# Patient Record
Sex: MALE | Race: WHITE | NOT HISPANIC OR LATINO | Employment: UNEMPLOYED | URBAN - METROPOLITAN AREA
[De-identification: names, ages, dates, MRNs, and addresses within clinical notes are randomized per-mention and may not be internally consistent; named-entity substitution may affect disease eponyms.]

---

## 2017-01-09 ENCOUNTER — GENERIC CONVERSION - ENCOUNTER (OUTPATIENT)
Dept: OTHER | Facility: OTHER | Age: 57
End: 2017-01-09

## 2017-02-07 ENCOUNTER — GENERIC CONVERSION - ENCOUNTER (OUTPATIENT)
Dept: OTHER | Facility: OTHER | Age: 57
End: 2017-02-07

## 2017-03-24 ENCOUNTER — GENERIC CONVERSION - ENCOUNTER (OUTPATIENT)
Dept: OTHER | Facility: OTHER | Age: 57
End: 2017-03-24

## 2017-05-02 ENCOUNTER — GENERIC CONVERSION - ENCOUNTER (OUTPATIENT)
Dept: OTHER | Facility: OTHER | Age: 57
End: 2017-05-02

## 2017-06-16 ENCOUNTER — GENERIC CONVERSION - ENCOUNTER (OUTPATIENT)
Dept: OTHER | Facility: OTHER | Age: 57
End: 2017-06-16

## 2017-07-25 ENCOUNTER — GENERIC CONVERSION - ENCOUNTER (OUTPATIENT)
Dept: OTHER | Facility: OTHER | Age: 57
End: 2017-07-25

## 2017-09-13 ENCOUNTER — GENERIC CONVERSION - ENCOUNTER (OUTPATIENT)
Dept: OTHER | Facility: OTHER | Age: 57
End: 2017-09-13

## 2017-10-11 ENCOUNTER — GENERIC CONVERSION - ENCOUNTER (OUTPATIENT)
Dept: OTHER | Facility: OTHER | Age: 57
End: 2017-10-11

## 2017-10-13 ENCOUNTER — GENERIC CONVERSION - ENCOUNTER (OUTPATIENT)
Dept: OTHER | Facility: OTHER | Age: 57
End: 2017-10-13

## 2017-11-07 ENCOUNTER — GENERIC CONVERSION - ENCOUNTER (OUTPATIENT)
Dept: OTHER | Facility: OTHER | Age: 57
End: 2017-11-07

## 2018-01-12 NOTE — PROGRESS NOTES
Assessment    1  Medicare annual wellness visit, subsequent (V70 0) (Z00 00)    Plan  Hypothyroidism    · From  Levothyroxine Sodium 300 MCG Oral Tablet TAKE 1 TABLET Daily six  (6) days a week or as directed To Levothyroxine Sodium 300 MCG Oral Tablet Take 1  tablet daily  PMH: Encounter for preventive health examination    · *VB - Fall Risk Assessment  (Dx V80 09 Screen for Neurologic Disorder);  Status:Complete;   Done: [id number] 11:02AM   · *VB Glaucoma Screen; Status:Complete;   Done: [id number] 11:03AM   · *VB-Depression Screening; Status:Complete;   Done: 25ZTL6000 11:05AM   · *VB-Urinary Incontinence Screen (Dx V81 6 Screen for UI); Status:Complete;   Done:  26RFQ4228 11:05AM    Discussion/Summary    He will continue to see multiple specialists w [facility] as is - will be undergoing PET scan soon to assess treatment response w chemo for lymphoma  See AWV screening papers - watch for falls, (uses cane)  Cardiac status, renal status appear stable - Diabetes has fluctuated w treatments  Watch feet closely w past hx, also will see Eye Dr Larry ireland - call for renewal when needed  Impression: Subsequent Annual Wellness Visit  Cardiovascular screening and counseling: screening is current  Diabetes screening and counseling: screening is current  Colorectal cancer screening and counseling: never done  Prostate cancer screening and counseling: sees Oncology  Glaucoma screening and counseling: is due  Immunizations: influenza vaccine is up to date this year, influenza vaccination is recommended annually and had pneumovax  Advance Directive Planning: sees Oncology full code status  Chief Complaint  AWV / reg check      History of Present Illness  HPI: in for AWV- since last here he has been dx w BCell Lymphoma- (was here w severe pharyngitis which led to dx) continues to see multiple specialists w [facility] - on chemo - washed out re same - lost 35 lbs    Endo/ Cardiology/ Nephrology (Crt 1 45 range, eGFR 53)/Oncology  Had DVT upper ext - completed anticoagulation    Glucometer 120-180, higher w chemo    Last hospital stay Dec at [address] [facility] and Wellness Visits: The patient is being seen for the subsequent annual wellness visit  Medicare Screening and Risk Factors   Hospitalizations: 12/17/2015  Medicare Screening Tests Risk Questions   Drug and Alcohol Use: The patient has never smoked cigarettes  The patient reports never drinking alcohol  Alcohol concern:   The patient has no concerns about alcohol abuse  He has never used illicit drugs  Diet and Physical Activity: Current diet includes low carbohydrate food choices, low salt food choices, limited junk food, 1-2 servings of fruit per day, 1-2 servings of vegetables per day, 2 servings of meat per day, 3 servings of dairy products per day, 1 cups of coffee per day, 4 cups of tea per day, 1 cans of diet soda per day and 2 glasses of water daily  He exercises infrequently  Exercise: walking  Mood Disorder and Cognitive Impairment Screening: He denies feeling down, depressed, or hopeless over the past two weeks  He reports feeling little interest or pleasure in doing things over the past two weeks  Cognitive impairment screening: denies difficulty learning/retaining new information, denies difficulty handling complex tasks, denies difficulty with reasoning, denies difficulty with spatial ability and orientation, denies difficulty with language and denies difficulty with behavior  Functional Ability/Level of Safety: Hearing is slightly decreased and a hearing aid is not used  Activities of daily living details: does not need help using the phone, no transportation help needed, does not need help shopping, no meal preparation help needed, does not need help doing housework, does not need help doing laundry, does not need help managing medications and does not need help managing money  Fall risk factors:   The patient fell 2 times in the past 12 months  and no urinary incontinence  Home safety risk factors:  unfamiliar surroundings, uneven floors and no handrails on the stairs, but no loose rugs, no poor household lighting, no household clutter and grab bars in the bathroom  Advance Directives: Advance directives: no living will, no durable power of  for health care directives and no advance directives  Co-Managers and Medical Equipment/Suppliers: See Patient Care Team      Review of Systems    Constitutional: as noted in HPI, no fever and no chills  ENT: sore throat, but as noted in HPI and no earache  Cardiovascular: no chest pain, no palpitations and the heart is not racing  Respiratory: no wheezing, not sleeping upright or with extra pillows, no cough, no productive cough and not vomiting blood  Gastrointestinal: no abdominal pain, no abdominal bloating, no abdominal cramps, no nausea, no vomiting, no constipation, no bright red blood per rectum and no melena  Genitourinary: no dysuria  Integumentary and Breasts: no rashes and no skin lesions  Psychiatric: no anxiety and no depression  Hematologic and Lymphatic: no bleeding, but no swollen glands and no swollen glands in the neck  Yes, the patient is satisfied with His life  No, the patient has not dropped any activities or interests  No, the patient does not feel that their life is empty  Yes, the patient often gets bored  Point = 1  Yes, the patient is in good spirits most of the time  No, the patient is not afraid something bad is going to happen to them  Yes, the patient feels happy most of the time  No, the patient does not often feel helpless  No, the patient bright not often get restless and fidgety  No, the patient likes to go out and try new things  Yes, the patient thinks its wonderful to be alive now  No, the patient does not feel worthless the way they are currently        No, the patient does not feel full of energy  Point = 1  No, the patient does not feel their situation is hopeless  No, the patient does not feel that most people are better off then they are  Score 2   Normal 0 - 9, Mild Depression 10 - 19, Severe Depression 20 - 30      Active Problems    1  Aortic stenosis (747 22) (Q25 3)   2  History of Aortic Valve Replacement   3  B-cell lymphoma (202 80) (C85 10)   4  Cardiomyopathy (425 4) (I42 9)   5  Charcot's Joint Of The Left Foot (713 5)   6  Chronic kidney disease, stage 3 (585 3) (N18 3)   7  Congestive heart failure (428 0) (I50 9)   8  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 (362 07)   9  Edema (782 3) (R60 9)   10  Hyperlipidemia (272 4) (E78 5)   11  Hypertension (401 9) (I10)   12  Hypothyroidism (244 9) (E03 9)   13  History of Implantable Cardioverter-Defibrillator   14  History of Metacarpal Amputation (Each)   15  History of Nephrolithiasis (V13 01)   16  Obesity (278 00) (E66 9)   17  Peripheral neuropathy (356 9) (G62 9)   18  Poorly controlled type 2 diabetes mellitus (250 00) (E11 65)   19  Third degree AV block (426 0) (I44 2)   20  History of Transient Acute Renal Failure (584 9)    Past Medical History    · History of Nephrolithiasis (V13 01)   · History of Transient Acute Renal Failure (584 9)    The active problems and past medical history were reviewed and updated today  Surgical History    · History of Aortic Valve Replacement   · History of Appendectomy   · History of Cardiac Cath Procedure Outcome:   · History of Cholecystotomy   · History of Implantable Cardioverter-Defibrillator   · History of Metacarpal Amputation (Each)    The surgical history was reviewed and updated today  Family History    · Family history of Coronary atherosclerosis    Social History    · Never A Smoker   · No alcohol use   · Physical Disability:  The social history was reviewed and updated today  Current Meds   1  Allopurinol 100 MG Oral Tablet;  Take 1 tablet daily; Therapy: (Recorded:08Apr2016) to Recorded   2  Aspirin EC 81 MG Oral Tablet Delayed Release; Take 1 tablet daily; Therapy: (Recorded:16Mar2015) to Recorded   3  Carvedilol 25 MG Oral Tablet; TAKE 1 TABLET TWICE DAILY; Therapy: [id number] to Recorded   4  HumuLIN R U-500 (CONCENTRATED) 500 UNIT/ML Subcutaneous Solution; Inject 19   units at 6 am, 19 units at noon, 17 units at 6   pm and 15 units at midnight; Therapy: [id number] to Recorded   5  Levothyroxine Sodium 300 MCG Oral Tablet; TAKE 1 TABLET Daily six (6) days a week   or as directed; Therapy: [id number] to (Shasta Otero)  Requested for: 08Apr2015; Last   Rx:19Nov2014; Status: ACTIVE - Renewal Voided Ordered   6  Losartan Potassium 25 MG Oral Tablet; TAKE 1 TABLET TWICE DAILY  (holds if AM BP   low); Therapy: [id number] to (Wes Mims) Recorded   7  Torsemide 20 MG Oral Tablet; 40 mg twice a day or as directed; Therapy: 76RZO1054 to Recorded   8  Zetia 10 MG Oral Tablet; Take 1 tablet daily; Therapy: 96QSV4706 to Recorded    The medication list was reviewed and updated today  Allergies    1  Actos TABS   2  Lipitor TABS   3  Pravachol TABS    Immunizations   1 2 3    Influenza  [id number]     Pneumococcal  [id number] [id number]     Vitals  Signs [Data Includes: Current Encounter]    Heart Rate: 88  Systolic: 537  Diastolic: 88  Height: 5 ft 11 in  Weight: 318 lb 2 08 oz  BMI Calculated: 44 37  BSA Calculated: 2 56    Physical Exam    Constitutional noticeable weight loss, hair loss, pleasant but somewhat washed out  Eyes   Conjunctiva and lids: No erythema, swelling or discharge  no icterus  Ears, Nose, Mouth, and Throat   Otoscopic examination: Tympanic membranes translucent with normal light reflex  Canals patent without erythema  hearing sl diminished b/l  no redness/ lesuion  Neck   Thyroid: Normal, no thyromegaly  Pulmonary   Auscultation of lungs: Clear to auscultation      Cardiovascular RRR    Carotid pulses: 2+ bilaterally  tr edema left ankle >> R  Abdomen   Abdomen: Non-tender, no masses  Psychiatric   Judgment and insight: Normal     Orientation to person, place and time: Normal     Recent and remote memory: Intact  Mood and affect: Normal        Results/Data  *VB-Depression Screening 08Apr2016 11:05AM Kareem Bran     Test Name Result Flag Reference   Depression Scale Result      Depression Screen - Positive Findings     *VB-Urinary Incontinence Screen (Dx V81 6 Screen for UI) 08Apr2016 11:05AM Kareem Bran     Test Name Result Flag Reference   Urinary Incontinence Assessment 08Apr2016       *VB Glaucoma Screen 08Apr2016 11:03AM Kareem Bran     Test Name Result Flag Reference   Glaucoma Screen 4/8/2016       *VB - Fall Risk Assessment  (Dx V80 09 Screen for Neurologic Disorder) 08Apr2016 11:02AM Kareem Bran     Test Name Result Flag Reference   Fall Risk Assessment [id number]         Signatures   Electronically signed by : Chel Doyle DO;  Apr 8 2016 12:49PM EST                       (Author)

## 2018-06-07 LAB
LEFT EYE DIABETIC RETINOPATHY: NORMAL
RIGHT EYE DIABETIC RETINOPATHY: NORMAL

## 2019-01-10 ENCOUNTER — TELEPHONE (OUTPATIENT)
Dept: FAMILY MEDICINE CLINIC | Facility: CLINIC | Age: 59
End: 2019-01-10

## 2019-01-10 NOTE — TELEPHONE ENCOUNTER
----- Message from Khadar Stuart sent at 1/9/2019 12:30 PM EST -----  Regarding: RE: Schedule  Scheduled pt for 2/5/19 w/ dr Rozann Cranker for AWV  ----- Message -----  From: Heber Santana MA  Sent: 12/20/2018  11:30 AM  To: Heber Santana MA  Subject: RE: Schedule                                     Spoke w/ pt - he will call back in the new year to schedule appt   ----- Message -----  From: Lakhwinder Valladares  Sent: 12/19/2018  11:02 AM  To: Heber Santana MA  Subject: Schedule                                         Please attempt to call and schedule pt for AWV last seen 4/2016

## 2019-02-04 PROBLEM — R10.13 DYSPEPSIA: Status: ACTIVE | Noted: 2017-11-10

## 2019-02-04 PROBLEM — E11.621 DIABETIC ULCER OF RIGHT MIDFOOT ASSOCIATED WITH TYPE 2 DIABETES MELLITUS, WITH FAT LAYER EXPOSED (HCC): Status: ACTIVE | Noted: 2018-01-17

## 2019-02-04 PROBLEM — IMO0002 TYPE 2 DIABETES MELLITUS WITH NEUROLOGICAL MANIFESTATIONS, UNCONTROLLED: Status: ACTIVE | Noted: 2019-02-04

## 2019-02-04 PROBLEM — E66.01 MORBID OBESITY (HCC): Status: ACTIVE | Noted: 2017-07-11

## 2019-02-04 PROBLEM — L97.412 DIABETIC ULCER OF RIGHT MIDFOOT ASSOCIATED WITH TYPE 2 DIABETES MELLITUS, WITH FAT LAYER EXPOSED (HCC): Status: ACTIVE | Noted: 2018-01-17

## 2019-02-04 PROBLEM — L84 PRE-ULCERATIVE CORN OR CALLOUS: Status: ACTIVE | Noted: 2017-10-11

## 2019-02-04 PROBLEM — K59.09 OTHER CONSTIPATION: Status: ACTIVE | Noted: 2017-11-10

## 2019-02-04 PROBLEM — Z59.9 FINANCIAL DIFFICULTY: Status: ACTIVE | Noted: 2017-07-11

## 2019-02-04 PROBLEM — I20.9 ANGINA PECTORIS (HCC): Status: ACTIVE | Noted: 2018-12-21

## 2019-02-04 RX ORDER — ASPIRIN 81 MG/1
1 TABLET ORAL DAILY
COMMUNITY
End: 2019-02-05 | Stop reason: ALTCHOICE

## 2019-02-04 RX ORDER — ASPIRIN 325 MG
325 TABLET ORAL DAILY
COMMUNITY

## 2019-02-04 RX ORDER — CARVEDILOL 25 MG/1
25 TABLET ORAL 2 TIMES DAILY
COMMUNITY

## 2019-02-04 RX ORDER — ALLOPURINOL 100 MG/1
1 TABLET ORAL DAILY
COMMUNITY

## 2019-02-05 ENCOUNTER — OFFICE VISIT (OUTPATIENT)
Dept: FAMILY MEDICINE CLINIC | Facility: CLINIC | Age: 59
End: 2019-02-05
Payer: MEDICARE

## 2019-02-05 VITALS
BODY MASS INDEX: 44.1 KG/M2 | HEART RATE: 70 BPM | SYSTOLIC BLOOD PRESSURE: 122 MMHG | HEIGHT: 71 IN | WEIGHT: 315 LBS | OXYGEN SATURATION: 95 % | DIASTOLIC BLOOD PRESSURE: 74 MMHG

## 2019-02-05 DIAGNOSIS — E66.01 MORBID OBESITY (HCC): ICD-10-CM

## 2019-02-05 DIAGNOSIS — J32.0 CHRONIC MAXILLARY SINUSITIS: ICD-10-CM

## 2019-02-05 DIAGNOSIS — Z95.810 ICD (IMPLANTABLE CARDIOVERTER-DEFIBRILLATOR) IN PLACE: ICD-10-CM

## 2019-02-05 DIAGNOSIS — I48.0 PAROXYSMAL ATRIAL FIBRILLATION (HCC): ICD-10-CM

## 2019-02-05 DIAGNOSIS — C83.30 DIFFUSE LARGE B-CELL LYMPHOMA, UNSPECIFIED BODY REGION (HCC): ICD-10-CM

## 2019-02-05 DIAGNOSIS — Z12.5 SCREENING PSA (PROSTATE SPECIFIC ANTIGEN): ICD-10-CM

## 2019-02-05 DIAGNOSIS — Z00.00 MEDICARE ANNUAL WELLNESS VISIT, SUBSEQUENT: Primary | ICD-10-CM

## 2019-02-05 DIAGNOSIS — IMO0002 TYPE 2 DIABETES MELLITUS WITH NEUROLOGICAL MANIFESTATIONS, UNCONTROLLED: ICD-10-CM

## 2019-02-05 PROBLEM — I20.9 ANGINA PECTORIS (HCC): Status: RESOLVED | Noted: 2018-12-21 | Resolved: 2019-02-05

## 2019-02-05 PROBLEM — I25.10 CORONARY ARTERY DISEASE INVOLVING NATIVE CORONARY ARTERY OF NATIVE HEART WITHOUT ANGINA PECTORIS: Status: ACTIVE | Noted: 2019-02-05

## 2019-02-05 PROBLEM — E11.621 DIABETIC ULCER OF RIGHT MIDFOOT ASSOCIATED WITH TYPE 2 DIABETES MELLITUS, WITH FAT LAYER EXPOSED (HCC): Status: RESOLVED | Noted: 2018-01-17 | Resolved: 2019-02-05

## 2019-02-05 PROBLEM — R10.13 DYSPEPSIA: Status: RESOLVED | Noted: 2017-11-10 | Resolved: 2019-02-05

## 2019-02-05 PROBLEM — L97.412 DIABETIC ULCER OF RIGHT MIDFOOT ASSOCIATED WITH TYPE 2 DIABETES MELLITUS, WITH FAT LAYER EXPOSED (HCC): Status: RESOLVED | Noted: 2018-01-17 | Resolved: 2019-02-05

## 2019-02-05 PROBLEM — J32.9 CHRONIC SINUSITIS: Status: ACTIVE | Noted: 2019-02-05

## 2019-02-05 PROCEDURE — G0439 PPPS, SUBSEQ VISIT: HCPCS | Performed by: FAMILY MEDICINE

## 2019-02-05 PROCEDURE — 99213 OFFICE O/P EST LOW 20 MIN: CPT | Performed by: FAMILY MEDICINE

## 2019-02-05 RX ORDER — TORSEMIDE 100 MG/1
100 TABLET ORAL DAILY
COMMUNITY
Start: 2018-11-20 | End: 2020-06-08

## 2019-02-05 RX ORDER — POLYETHYLENE GLYCOL 1450
17 POWDER (GRAM) MISCELLANEOUS DAILY
COMMUNITY

## 2019-02-05 RX ORDER — LEVOTHYROXINE SODIUM 300 UG/1
1 TABLET ORAL DAILY
COMMUNITY
Start: 2014-03-24

## 2019-02-05 RX ORDER — AMOXICILLIN AND CLAVULANATE POTASSIUM 875; 125 MG/1; MG/1
1 TABLET, FILM COATED ORAL EVERY 12 HOURS SCHEDULED
Qty: 20 TABLET | Refills: 0 | Status: SHIPPED | OUTPATIENT
Start: 2019-02-05 | End: 2019-02-15

## 2019-02-05 RX ORDER — LOSARTAN POTASSIUM 25 MG/1
25 TABLET ORAL 2 TIMES DAILY
COMMUNITY
Start: 2019-01-28

## 2019-02-05 RX ORDER — SPIRONOLACTONE 25 MG/1
25 TABLET ORAL DAILY
COMMUNITY

## 2019-02-05 NOTE — ASSESSMENT & PLAN NOTE
He will continue with medication as is, continue under the care of [facility] End[facility]ology

## 2019-02-05 NOTE — PROGRESS NOTES
Assessment and Plan:    Problem List Items Addressed This Visit        Endocrine    Type 2 diabetes mellitus with neurological manifestations, uncontrolled ([address] )     He will continue with medication as is, continue under the care of [facility] Endocrinology  Relevant Medications    Insulin Glargine (TOUJEO) 300 units/mL CONCETRATED U-300 injection pen    insulin glulisine (APIDRA) 100 units/mL injection       Respiratory    Chronic sinusitis    Relevant Medications    amoxicillin-clavulanate (AUGMENTIN) 875-125 mg per tablet       Cardiovascular and Mediastinum    Paroxysmal atrial fibrillation (HCC)     On aspirin only at his request, declined anticoagulation, rate controlled         Relevant Medications    carvedilol (COREG) 25 mg tablet       Other    Diffuse large B cell lymphoma ([address] )    ICD (implantable cardioverter-defibrillator) in place     Sees Cardiology         Morbid obesity ([place] [address] )     Needs to work at portion control/weight loss           Other Visit Diagnoses     Medicare annual wellness visit, subsequent    -  Primary    Screening PSA (prostate specific antigen)        Relevant Orders    PSA, Total Screen        Health Maintenance Due   Topic Date Due    Hepatitis C Screening  1960    CRC Screening: Colonoscopy  1960    Diabetic Foot Exam  08/14/1968    DM Eye Exam  08/14/1968    URINE MICROALBUMIN  08/14/1968    DTaP,Tdap,and Td Vaccines (1 - Tdap) 08/14/1979     See other note today regarding provider information    HPI:  Balbir Danielson is a 59 y o  male here for his Subsequent Wellness Visit      Patient Active Problem List   Diagnosis    Type 2 diabetes mellitus with neurological manifestations, uncontrolled ([address] )    Third degree AV block ([address] )    Secondary hyperparathyroidism ([address] )    S/P AVR    Pre-ulcerative corn or callous    Poorly controlled type 2 diabetes mellitus (HCC)    Peripheral neuropathy    Paroxysmal atrial fibrillation ([address] )    PAD (peripheral artery disease) (HCC)    Other constipation    Obstructive sleep apnea on CPAP    Nonischemic cardiomyopathy (HCC)    Morbid obesity ([facility] )    ICD (implantable cardioverter-defibrillator) in place    Acquired hypothyroidism    Hyperlipidemia    Financial difficulty    Essential hypertension    Edema    DVT of upper extremity (deep vein thrombosis) (HCC)    Diffuse large B cell lymphoma (HCC)    Diabetic macular edema (HCC)    Congestive heart failure (HCC)    Chronic kidney disease, stage 3 (HCC)    Arthropathy associated with neurological disorder    Aortic stenosis    Chronic sinusitis    Coronary artery disease involving native coronary artery of native heart without angina pectoris     No past medical history on file  No past surgical history on file  No family history on file    History   Smoking Status    Never Smoker   Smokeless Tobacco    Never Used     History   Alcohol Use No      History   Drug Use No       Current Outpatient Prescriptions   Medication Sig Dispense Refill    aspirin 325 mg tablet Take 325 mg by mouth daily        Calcifediol ER (RAYALDEE) 30 MCG CPCR Take 30 mcg by mouth daily      carvedilol (COREG) 25 mg tablet Take 25 mg by mouth 2 (two) times a day      Insulin Glargine (TOUJEO) 300 units/mL CONCETRATED U-300 injection pen Inject 60 Units under the skin 2 (two) times a day      insulin glulisine (APIDRA) 100 units/mL injection Inject 60 Units under the skin 3 (three) times a day      levothyroxine 300 MCG tablet Take 1 tablet by mouth daily      losartan (COZAAR) 25 mg tablet Take 25 mg by mouth 2 (two) times a day      spironolactone (ALDACTONE) 25 mg tablet Take 25 mg by mouth daily      torsemide (DEMADEX) 100 mg tablet Take 100 mg by mouth daily      allopurinol (ZYLOPRIM) 100 mg tablet Take 1 tablet by mouth daily      amoxicillin-clavulanate (AUGMENTIN) 875-125 mg per tablet Take 1 tablet by mouth every 12 (twelve) hours for 10 days 20 tablet 0    Polyethylene Glycol POWD Take 17 g by mouth daily       No current facility-administered medications for this visit  Allergies   Allergen Reactions    Atorvastatin Myalgia and Other (See Comments)     myalgia    Pioglitazone Edema    Pravastatin Myalgia and Other (See Comments)     muscle aches    Simvastatin Other (See Comments)     myalgia    Statins Other (See Comments)     Back pain     Immunization History   Administered Date(s) Administered    Influenza 03/17/2014, 10/08/2014, 09/10/2015, 09/20/2017    Influenza Quadrivalent Preservative Free 3 years and older IM 09/01/2018    Influenza Quadrivalent, 6-35 Months IM 09/13/2016    Influenza TIV (IM) 09/15/2014, 09/01/2015    Pneumococcal Conjugate 13-Valent 09/01/2018    Pneumococcal Polysaccharide PPV23 09/23/2002, 09/15/2014, 09/01/2015, 09/13/2016       Patient Care Team:  Grecia Gagnon DO as PCP - General    Medicare Screening Tests and Risk Assessments:  Maurizio Jackson is here for his Subsequent Wellness visit  Health Risk Assessment:  Patient rates overall health as fair  Patient feels that their physical health rating is Same  Eyesight was rated as Slightly worse  Hearing was rated as Same  Patient feels that their emotional and mental health rating is Same  Pain experienced by patient in the last 7 days has been Some  Patient's pain rating has been 4/10  Emotional/Mental Health:  Patient has been feeling nervous/anxious  PHQ-9 Depression Screening:    Frequency of the following problems over the past two weeks:      1  Little interest or pleasure in doing things: 0 - not at all      2  Feeling down, depressed, or hopeless: 0 - not at all  PHQ-2 Score: 0          Broken Bones/Falls: Fall Risk Assessment:    In the past year, patient has experienced: No history of falling in past year          Bladder/Bowel:  Patient has not leaked urine accidently in the last six months    Patient reports no loss of bowel control  Immunizations:  Patient has had a flu vaccination within the last year  Patient has received a pneumonia shot  Patient has not received a shingles shot  Home Safety:  Patient does not have trouble with stairs inside or outside of their home  Patient currently reports that there are no safety hazards present in home, working smoke alarms, no working carbon monoxide detectors  Preventative Screenings:   no colon cancer screen completed, cholesterol screen completed, glaucoma eye exam completed,     Nutrition:  Current diet: Low Carb and No Added Salt with servings of the following:    Medications:  Patient is not currently taking any over-the-counter supplements  Patient is able to manage medications  Lifestyle Choices:  Patient reports no tobacco use  Patient has not smoked or used tobacco in the past   Patient reports no alcohol use  Patient drives a vehicle  Activities of Daily Living:  Can get out of bed by his or her self, able to dress self, able to make own meals, able to do own shopping, able to bathe self, can do own laundry/housekeeping, can manage own money, pay bills and track expenses    Previous Hospitalizations:  No hospitalization or ED visit in past 12 months        Advanced Directives:  Patient has not completed advanced directive

## 2019-02-05 NOTE — PATIENT INSTRUCTIONS
We reviewed his health conditions/updated medication list, he does have total opacification right maxillary sinus on CT/PET with sinus symptoms, he relates he is intolerant nasal sprays  We did discuss seeing ENT, he declines at present  He will use Augmentin twice daily times 10 days  Watch for diarrhea  Immunization History   Administered Date(s) Administered    Influenza 03/17/2014, 10/08/2014, 09/10/2015, 09/20/2017    Influenza Quadrivalent Preservative Free 3 years and older IM 09/01/2018    Influenza Quadrivalent, 6-35 Months IM 09/13/2016    Influenza TIV (IM) 09/15/2014, 09/01/2015    Pneumococcal Conjugate 13-Valent 09/01/2018    Pneumococcal Polysaccharide PPV23 09/23/2002, 09/15/2014, 09/01/2015, 09/13/2016       Discussed Pneumococcal vaccines,    Prevnar  is up to date   Pneumovax  is up to date  He does do yearly Flu shot  Tdap/tetanus shot will be done at a future date  (done every 10 yrs for superficial cuts, every 5 yrs for deep wounds)   Can also look into coverage for new shingles shot, Shingrix (indicated if over 48years of age ) Can do that at pharmacy  Was never a smoker     Regarding Colon Cancer screening,   Screening is up to date  was negative March 2018  Also had EGD October 2017       Discussed pros and cons regarding Prostate Cancer screening,   PSA blood test  ordered -he wishes to do  We discussed end of life planning, does not have a  "LIVING WILL" -  has discussed his wishes with his son, does not have finalized form  Regarding Hepatitis C Screening,  previously perfomed and was negative    Glaucoma screening is up-to-date    We did review previous blood work,   he does have slip to redo blood work via Endocrinology  He is up to date with Lipid screening  He is up to date with Diabetes screening  Discussed importance of wt loss ( runs 315-330 lbs recently )/ healthy diet      We will see him again yearly ( as sees multiple Drs)

## 2019-02-05 NOTE — PROGRESS NOTES
Reilly SEGURA  [address] [facility]  [facility] [address]  [address]   [address]  [place], [place], [address]     MEDICARE SUBSEQUENT VISIT NOTE ( part 1 )      NAME: Cesar Medina  AGE: 59 y o  SEX: male  : 1960   MRN: [id number]    DATE: 2019  TIME: 5:47 PM    Assessment and Plan     Problem List Items Addressed This Visit        Endocrine    Type 2 diabetes mellitus with neurological manifestations, uncontrolled ([facility] )     He will continue with medication as is, continue under the care of [facility]  Relevant Medications    Insulin Glargine (TOUJEO) 300 units/mL CONCETRATED U-300 injection pen    insulin glulisine (APIDRA) 100 units/mL injection       Respiratory    Chronic sinusitis    Relevant Medications    amoxicillin-clavulanate (AUGMENTIN) 875-125 mg per tablet       Cardiovascular and Mediastinum    Paroxysmal atrial fibrillation (HCC)     On aspirin only at his request, declined anticoagulation, rate controlled         Relevant Medications    carvedilol (COREG) 25 mg tablet       Other    Diffuse large B cell lymphoma ([facility] )    ICD (implantable cardioverter-defibrillator) in place     Sees Cardiology         Morbid obesity ([facility] )     Needs to work at portion control/weight loss           Other Visit Diagnoses     Medicare annual wellness visit, subsequent    -  Primary    Screening PSA (prostate specific antigen)        Relevant Orders    PSA, Total Screen          Medicare Wellness Counseling/ Discussion    Patient Instructions     We reviewed his health conditions/updated medication list, he does have total opacification right maxillary sinus on CT/PET with sinus symptoms, he relates he is intolerant nasal sprays  We did discuss seeing ENT, he declines at present  He will use Augmentin twice daily times 10 days  Watch for diarrhea      Immunization History   Administered Date(s) Administered    Influenza 2014, 10/08/2014, 09/10/2015, 09/20/2017    Influenza Quadrivalent Preservative Free 3 years and older IM 09/01/2018    Influenza Quadrivalent, 6-35 Months IM 09/13/2016    Influenza TIV (IM) 09/15/2014, 09/01/2015    Pneumococcal Conjugate 13-Valent 09/01/2018    Pneumococcal Polysaccharide PPV23 09/23/2002, 09/15/2014, 09/01/2015, 09/13/2016       Discussed Pneumococcal vaccines,    Prevnar  is up to date   Pneumovax  is up to date  He does do yearly Flu shot  Tdap/tetanus shot will be done at a future date  (done every 10 yrs for superficial cuts, every 5 yrs for deep wounds)   Can also look into coverage for new shingles shot, Shingrix (indicated if over 48years of age ) Can do that at pharmacy  Was never a smoker     Regarding Colon Cancer screening,   Screening is up to date  was negative March 2018  Also had EGD October 2017       Discussed pros and cons regarding Prostate Cancer screening,   PSA blood test  ordered -he wishes to do  We discussed end of life planning, does not have a  "LIVING WILL" -  has discussed his wishes with his son, does not have finalized form  Regarding Hepatitis C Screening,  previously perfomed and was negative    Glaucoma screening is up-to-date    We did review previous blood work,   he does have slip to redo blood work via Endocrinology  He is up to date with Lipid screening  He is up to date with Diabetes screening  Discussed importance of wt loss ( runs 315-330 lbs recently )/ healthy diet      We will see him again yearly ( as sees multiple Drs)                 Chief Complaint     Chief Complaint   Patient presents with   [facility] Wellness Visit     Subsequent        History of Present Illness     Danielle Hendrickson is a 59y o -year-old male who is in today for an annual wellness visit, I have not seen him in over 1 year, he continues to see multiple specialists at [facility]   He relates he has been feeling fairly well, has not been hospitalized recently  He relates he is using all medication as directed  Review of Systems     Review of Systems   Constitutional: Positive for fatigue (Chronic, no worse than baseline) and unexpected weight change (Weight remains far too high, no sign fluid retentionWorsening)  Negative for activity change, appetite change, chills and fever  HENT: Negative for congestion, mouth sores, nosebleeds, sinus pressure, sore throat and trouble swallowing  Eyes: Negative for visual disturbance (He sees his eye doctor, retinopathy)  Respiratory: Positive for shortness of breath (No worse than baseline)  Negative for cough, choking and chest tightness  Cardiovascular: Positive for palpitations (Occasional)  Negative for chest pain and leg swelling  Gastrointestinal: Negative for abdominal pain, blood in stool, constipation, diarrhea, nausea and vomiting  No acid reflux     No change in bowel   Genitourinary: Negative for dysuria and hematuria  Neurological: Negative for dizziness, syncope, speech difficulty, light-headedness and headaches  Psychiatric/Behavioral: Negative for behavioral problems, confusion and sleep disturbance         Active Problem List     Patient Active Problem List   Diagnosis    Type 2 diabetes mellitus with neurological manifestations, uncontrolled ([address] )    Third degree AV block ([address] )    Secondary hyperparathyroidism ([address] )    S/P AVR    Pre-ulcerative corn or callous    Poorly controlled type 2 diabetes mellitus ([address] )    Peripheral neuropathy    Paroxysmal atrial fibrillation (HCC)    PAD [facility]peripheral artery disease) ([facility]    Other constipation    Obstructive sleep apnea on CPAP    Nonischemic cardiomyopathy ([address] )    Morbid obesity ([address] )    ICD (implantable cardioverter-defibrillator) in place    Acquired hypothyroidism    Hyperlipidemia    Financial difficulty    Essential hypertension    Edema    DVT of upper extremity (deep vein thrombosis) ([address] )    Diffuse large B cell lymphoma (HCC)    Diabetic macular edema (HCC)    Congestive heart failure (HCC)    Chronic kidney disease, stage 3 (HCC)    Arthropathy associated with neurological disorder    Aortic stenosis    Chronic sinusitis    Coronary artery disease involving native coronary artery of native heart without angina pectoris       Past Medical History:  No past medical history on file  Past Surgical History:  No past surgical history on file  Family History:  No family history on file  Social History:  Social History     Social History    Marital status: Single     Spouse name: N/A    Number of children: N/A    Years of education: N/A     Occupational History    Not on file  Social History Main Topics    Smoking status: Never Smoker    Smokeless tobacco: Never Used    Alcohol use No    Drug use: No    Sexual activity: Not on file     Other Topics Concern    Not on file     Social History Narrative    No narrative on file       Objective     Vitals:    02/05/19 0956   BP: 122/74   BP Location: Left arm   Patient Position: Sitting   Cuff Size: Large   Pulse: 70   SpO2: 95%   Weight: (!) 151 kg (333 lb 9 6 oz)   Height: 5' 11" (1 803 m)     Body mass index is 46 53 kg/m²  BP Readings from Last 3 Encounters:   02/05/19 122/74   04/08/16 148/88   09/14/15 138/74       Wt Readings from Last 3 Encounters:   02/05/19 (!) 151 kg (333 lb 9 6 oz)   04/08/16 (!) 144 kg (318 lb 2 oz)   09/14/15 (!) 161 kg (354 lb 15 9 oz)       Physical Exam   Constitutional: He is oriented to person, place, and time  No distress  Pleasant morbidly obese male   Eyes: Conjunctivae are normal  No scleral icterus  Cardiovascular:   Irregularly irregular at controlled rate   Pulmonary/Chest: Effort normal and breath sounds normal  No respiratory distress  He has no rales  Abdominal: Soft  There is no tenderness  Musculoskeletal: He exhibits no edema     Lymphadenopathy:     He has no cervical adenopathy  Neurological: He is alert and oriented to person, place, and time  Psychiatric: He has a normal mood and affect  ALLERGIES:  Allergies   Allergen Reactions    Atorvastatin Myalgia and Other (See Comments)     myalgia    Pioglitazone Edema    Pravastatin Myalgia and Other (See Comments)     muscle aches    Simvastatin Other (See Comments)     myalgia    Statins Other (See Comments)     Back pain       Current Medications     Current Outpatient Prescriptions   Medication Sig Dispense Refill    aspirin 325 mg tablet Take 325 mg by mouth daily        Calcifediol ER (RAYALDEE) 30 MCG CPCR Take 30 mcg by mouth daily      carvedilol (COREG) 25 mg tablet Take 25 mg by mouth 2 (two) times a day      Insulin Glargine (TOUJEO) 300 units/mL CONCETRATED U-300 injection pen Inject 60 Units under the skin 2 (two) times a day      insulin glulisine (APIDRA) 100 units/mL injection Inject 60 Units under the skin 3 (three) times a day      levothyroxine 300 MCG tablet Take 1 tablet by mouth daily      losartan (COZAAR) 25 mg tablet Take 25 mg by mouth 2 (two) times a day      spironolactone (ALDACTONE) 25 mg tablet Take 25 mg by mouth daily      torsemide (DEMADEX) 100 mg tablet Take 100 mg by mouth daily      allopurinol (ZYLOPRIM) 100 mg tablet Take 1 tablet by mouth daily      amoxicillin-clavulanate (AUGMENTIN) 875-125 mg per tablet Take 1 tablet by mouth every 12 (twelve) hours for 10 days 20 tablet 0    Polyethylene Glycol POWD Take 17 g by mouth daily       No current facility-administered medications for this visit            Health Maintenance     See other note today re clinical AWV info             Most recent labs available from [address]   ( others may be available in Care Everywhere / Media sections)  No results found for: WBC, HGB, HCT, PLT, CHOL, TRIG, HDL, LDLDIRECT, ALT, AST, NA, K, CL, CREATININE, BUN, CO2, TSH, PSA, INR, GLUF, HGBA1C, MICROALBUR  No results found for: [address]  No results found for: [id number], TSH    Orders Placed This Encounter   Procedures    PSA, Total Screen             Bri Ocasio, DO

## 2019-03-27 ENCOUNTER — TELEPHONE (OUTPATIENT)
Dept: FAMILY MEDICINE CLINIC | Facility: CLINIC | Age: 59
End: 2019-03-27

## 2019-03-27 DIAGNOSIS — Z99.89 OBSTRUCTIVE SLEEP APNEA ON CPAP: Primary | ICD-10-CM

## 2019-03-27 DIAGNOSIS — G47.33 OBSTRUCTIVE SLEEP APNEA ON CPAP: Primary | ICD-10-CM

## 2019-06-17 LAB — HBA1C MFR BLD HPLC: 8.3 %

## 2019-09-19 LAB
LEFT EYE DIABETIC RETINOPATHY: NORMAL
RIGHT EYE DIABETIC RETINOPATHY: NORMAL

## 2019-10-18 LAB
EXT PROTEIN URINE: 23.2
HBA1C MFR BLD HPLC: 7.7 %

## 2020-05-11 ENCOUNTER — TELEPHONE (OUTPATIENT)
Dept: FAMILY MEDICINE CLINIC | Facility: CLINIC | Age: 60
End: 2020-05-11

## 2020-05-12 DIAGNOSIS — G47.33 OBSTRUCTIVE SLEEP APNEA ON CPAP: Primary | ICD-10-CM

## 2020-05-12 DIAGNOSIS — Z99.89 OBSTRUCTIVE SLEEP APNEA ON CPAP: Primary | ICD-10-CM

## 2020-06-08 ENCOUNTER — TELEMEDICINE (OUTPATIENT)
Dept: FAMILY MEDICINE CLINIC | Facility: CLINIC | Age: 60
End: 2020-06-08
Payer: MEDICARE

## 2020-06-08 VITALS — WEIGHT: 315 LBS | HEIGHT: 72 IN | BODY MASS INDEX: 42.66 KG/M2 | TEMPERATURE: 98.4 F | HEART RATE: 72 BPM

## 2020-06-08 DIAGNOSIS — I10 ESSENTIAL HYPERTENSION: ICD-10-CM

## 2020-06-08 DIAGNOSIS — C83.30 DIFFUSE LARGE B-CELL LYMPHOMA, UNSPECIFIED BODY REGION (HCC): ICD-10-CM

## 2020-06-08 DIAGNOSIS — Z99.89 OBSTRUCTIVE SLEEP APNEA ON CPAP: ICD-10-CM

## 2020-06-08 DIAGNOSIS — IMO0002 TYPE 2 DIABETES MELLITUS WITH NEUROLOGICAL MANIFESTATIONS, UNCONTROLLED: ICD-10-CM

## 2020-06-08 DIAGNOSIS — E66.01 MORBID OBESITY (HCC): ICD-10-CM

## 2020-06-08 DIAGNOSIS — G47.33 OBSTRUCTIVE SLEEP APNEA ON CPAP: ICD-10-CM

## 2020-06-08 DIAGNOSIS — I42.8 NONISCHEMIC CARDIOMYOPATHY (HCC): ICD-10-CM

## 2020-06-08 DIAGNOSIS — E11.65 POORLY CONTROLLED TYPE 2 DIABETES MELLITUS (HCC): ICD-10-CM

## 2020-06-08 DIAGNOSIS — Z00.00 MEDICARE ANNUAL WELLNESS VISIT, SUBSEQUENT: Primary | ICD-10-CM

## 2020-06-08 PROBLEM — E79.0 HYPERURICEMIA: Status: ACTIVE | Noted: 2019-10-10

## 2020-06-08 PROBLEM — E55.9 VITAMIN D DEFICIENCY: Status: ACTIVE | Noted: 2019-10-10

## 2020-06-08 PROCEDURE — 99214 OFFICE O/P EST MOD 30 MIN: CPT | Performed by: FAMILY MEDICINE

## 2020-06-08 PROCEDURE — 3074F SYST BP LT 130 MM HG: CPT | Performed by: FAMILY MEDICINE

## 2020-06-08 PROCEDURE — 3066F NEPHROPATHY DOC TX: CPT | Performed by: FAMILY MEDICINE

## 2020-06-08 PROCEDURE — 3008F BODY MASS INDEX DOCD: CPT | Performed by: FAMILY MEDICINE

## 2020-06-08 PROCEDURE — 1036F TOBACCO NON-USER: CPT | Performed by: FAMILY MEDICINE

## 2020-06-08 PROCEDURE — 3078F DIAST BP <80 MM HG: CPT | Performed by: FAMILY MEDICINE

## 2020-06-08 PROCEDURE — G0439 PPPS, SUBSEQ VISIT: HCPCS | Performed by: FAMILY MEDICINE

## 2020-07-21 PROBLEM — M86.9 FOOT OSTEOMYELITIS, RIGHT (HCC): Status: ACTIVE | Noted: 2020-07-06

## 2020-07-21 PROBLEM — E87.1 HYPONATREMIA: Status: ACTIVE | Noted: 2020-07-18

## 2020-07-21 PROBLEM — Z89.431 STATUS POST TRANSMETATARSAL AMPUTATION OF FOOT, RIGHT (HCC): Status: ACTIVE | Noted: 2020-07-21

## 2020-07-27 LAB
CREAT ?TM UR-SCNC: 114 UMOL/L
EXT PROTEIN URINE: 17.2
PROT/CREAT UR: 0.15 MG/G{CREAT}

## 2020-08-03 LAB — HBA1C MFR BLD HPLC: 6.9 %
